# Patient Record
Sex: FEMALE | Race: OTHER
[De-identification: names, ages, dates, MRNs, and addresses within clinical notes are randomized per-mention and may not be internally consistent; named-entity substitution may affect disease eponyms.]

---

## 2018-11-28 ENCOUNTER — TRANSCRIPTION ENCOUNTER (OUTPATIENT)
Age: 33
End: 2018-11-28

## 2018-11-28 ENCOUNTER — APPOINTMENT (OUTPATIENT)
Dept: INTERNAL MEDICINE | Facility: CLINIC | Age: 33
End: 2018-11-28
Payer: COMMERCIAL

## 2018-11-28 VITALS
BODY MASS INDEX: 24.29 KG/M2 | WEIGHT: 132 LBS | HEART RATE: 68 BPM | HEIGHT: 62 IN | TEMPERATURE: 98.3 F | OXYGEN SATURATION: 98 % | RESPIRATION RATE: 14 BRPM | DIASTOLIC BLOOD PRESSURE: 72 MMHG | SYSTOLIC BLOOD PRESSURE: 130 MMHG

## 2018-11-28 DIAGNOSIS — Z82.49 FAMILY HISTORY OF ISCHEMIC HEART DISEASE AND OTHER DISEASES OF THE CIRCULATORY SYSTEM: ICD-10-CM

## 2018-11-28 DIAGNOSIS — Z88.9 ALLERGY STATUS TO UNSPECIFIED DRUGS, MEDICAMENTS AND BIOLOGICAL SUBSTANCES: ICD-10-CM

## 2018-11-28 DIAGNOSIS — Z83.49 FAMILY HISTORY OF OTHER ENDOCRINE, NUTRITIONAL AND METABOLIC DISEASES: ICD-10-CM

## 2018-11-28 DIAGNOSIS — J30.9 ALLERGIC RHINITIS, UNSPECIFIED: ICD-10-CM

## 2018-11-28 DIAGNOSIS — Z86.19 PERSONAL HISTORY OF OTHER INFECTIOUS AND PARASITIC DISEASES: ICD-10-CM

## 2018-11-28 DIAGNOSIS — Z87.09 PERSONAL HISTORY OF OTHER DISEASES OF THE RESPIRATORY SYSTEM: ICD-10-CM

## 2018-11-28 DIAGNOSIS — Z83.2 FAMILY HISTORY OF DISEASES OF THE BLOOD AND BLOOD-FORMING ORGANS AND CERTAIN DISORDERS INVOLVING THE IMMUNE MECHANISM: ICD-10-CM

## 2018-11-28 PROCEDURE — 36415 COLL VENOUS BLD VENIPUNCTURE: CPT

## 2018-11-28 PROCEDURE — 99385 PREV VISIT NEW AGE 18-39: CPT | Mod: 25

## 2018-11-28 NOTE — HISTORY OF PRESENT ILLNESS
[Health Maintenance] : health maintenance [___ Year(s) Ago] : [unfilled] year(s) ago [Lives Alone] : Patient lives alone [Unmarried] : unmarried [Working Full Time] : working full time [Occupation ___] : occupation: [unfilled] [Never Smoked Cigarettes] : has never smoked cigarettes [Occasional Use] : occasional alcohol use [Never] : has never used illicit drugs [Good] : good [Reg. Dental Visits] : She has regular dental visits [Premenopausal] : the patient is premenopausal [FreeTextEntry1] : Needs new PMD. [Binge Drinking] : denies binge drinking [Patient Concern] : no personal concern about alcohol use [Family Concern] : no family concern about alcohol use [Vision Problems] : She denies vision problems [Hearing Loss] : She denies hearing loss [de-identified] : declines flu shots, hx Tdap 2017, hx hep B series [de-identified] : \par Feeling well.\par Last ate 2 hrs ago- 1/2 sandwich with veggies, wants labs today\par Needs med refill.\par \par \par c/o mild hair loss x 1.5 yrs\par -colors hair on/off\par -denies hair pulling\par -denies scalp itching or rash\par -on biotin\par -taking on iron qd x 3 months, denies hx anemia just started as thought may help.  Denies heavy menses.\par \par hx allergic rhinitis - has postnasal drip on/off througout the year\par -hx ENT eval 2 yrs ago for sx- dx'd postnasal drip, advised claritin and netti pot.  Told has deviated septum.  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

## 2018-11-28 NOTE — HEALTH RISK ASSESSMENT
[0] : 2) Feeling down, depressed, or hopeless: Not at all (0) [Patient reported PAP Smear was normal] : Patient reported PAP Smear was normal [HIV test declined] : HIV test declined [PapSmearDate] : 2016

## 2018-11-28 NOTE — PHYSICAL EXAM
[No Acute Distress] : no acute distress [Well-Appearing] : well-appearing [Normal Sclera/Conjunctiva] : normal sclera/conjunctiva [PERRL] : pupils equal round and reactive to light [EOMI] : extraocular movements intact [Normal Outer Ear/Nose] : the outer ears and nose were normal in appearance [Normal TMs] : both tympanic membranes were normal [Normal Nasal Mucosa] : the nasal mucosa was normal [Supple] : supple [No Lymphadenopathy] : no lymphadenopathy [Thyroid Normal, No Nodules] : the thyroid was normal and there were no nodules present [No Respiratory Distress] : no respiratory distress  [Clear to Auscultation] : lungs were clear to auscultation bilaterally [Normal Rate] : normal rate  [Regular Rhythm] : with a regular rhythm [Normal S1, S2] : normal S1 and S2 [No Murmur] : no murmur heard [Pedal Pulses Present] : the pedal pulses are present [No Edema] : there was no peripheral edema [Soft] : abdomen soft [Non Tender] : non-tender [No HSM] : no HSM [Normal Supraclavicular Nodes] : no supraclavicular lymphadenopathy [Normal Posterior Cervical Nodes] : no posterior cervical lymphadenopathy [Normal Anterior Cervical Nodes] : no anterior cervical lymphadenopathy [No CVA Tenderness] : no CVA  tenderness [No Spinal Tenderness] : no spinal tenderness [No Joint Swelling] : no joint swelling [Grossly Normal Strength/Tone] : grossly normal strength/tone [No Rash] : no rash [Normal Gait] : normal gait [No Focal Deficits] : no focal deficits [Normal Affect] : the affect was normal [Alert and Oriented x3] : oriented to person, place, and time [de-identified] : +cobblestoning [de-identified] : scattered freckles

## 2018-11-28 NOTE — PAST MEDICAL HISTORY
[Definite ___ (Date)] : the last menstrual period was [unfilled] [Total Preg ___] : G[unfilled] [Living ___] : Living: [unfilled] [Regular Cycle Intervals] : have been regular [AB Induced ___] : elective abortions: [unfilled]

## 2018-11-28 NOTE — REVIEW OF SYSTEMS
[Negative] : Psychiatric [Fever] : no fever [Chills] : no chills [Earache] : no earache [Hoarseness] : no hoarseness [Sore Throat] : no sore throat [Chest Pain] : no chest pain [Cough] : no cough [Dyspnea on Exertion] : no dyspnea on exertion [Abdominal Pain] : no abdominal pain [Dizziness] : no dizziness [FreeTextEntry4] : see HPI

## 2018-11-28 NOTE — ASSESSMENT
[FreeTextEntry1] : \par hair loss-\par -advised to avoid use of hair chemicals\par -check iron studies/TSH\par -derm f/u\par \par allergic rhinitis-\par -advised claritin and flonase prn\par -hx ENT eval\par \par hx cold sores-\par -valtrex prn\par \par \par \par HCM\par -check screening labs; agreeable to STD/HIV screening\par -STD prevention with regular use of condoms counseled\par -declines flu shot\par -hx Tdap 2017\par -hx hep B series\par -hx negative PAP 2016 by GYN per pt\par -derm referral for screening.  Regular use of sun block for skin cancer prevention advised.\par -optho referral for screening\par -yearly dental screening advised\par -yearly eye screening advised\par \par \par Pt's cell: [phone]\par Pt declines f/u appt, yearly CPE and f/u as needed advised.

## 2018-11-30 LAB
25(OH)D3 SERPL-MCNC: 17.3 NG/ML
ALBUMIN SERPL ELPH-MCNC: 4.9 G/DL
ALP BLD-CCNC: 48 U/L
ALT SERPL-CCNC: 10 U/L
ANION GAP SERPL CALC-SCNC: 12 MMOL/L
AST SERPL-CCNC: 18 U/L
BASOPHILS # BLD AUTO: 0.01 K/UL
BASOPHILS NFR BLD AUTO: 0.1 %
BILIRUB SERPL-MCNC: 0.3 MG/DL
BUN SERPL-MCNC: 9 MG/DL
CALCIUM SERPL-MCNC: 9.5 MG/DL
CHLORIDE SERPL-SCNC: 102 MMOL/L
CHOLEST SERPL-MCNC: 190 MG/DL
CHOLEST/HDLC SERPL: 3.9 RATIO
CO2 SERPL-SCNC: 24 MMOL/L
CREAT SERPL-MCNC: 0.57 MG/DL
EOSINOPHIL # BLD AUTO: 0.08 K/UL
EOSINOPHIL NFR BLD AUTO: 1.2 %
FERRITIN SERPL-MCNC: 69 NG/ML
FOLATE SERPL-MCNC: 17.1 NG/ML
GLUCOSE SERPL-MCNC: 108 MG/DL
HBA1C MFR BLD HPLC: 4.7 %
HCT VFR BLD CALC: 41.2 %
HDLC SERPL-MCNC: 49 MG/DL
HGB BLD-MCNC: 13.8 G/DL
IMM GRANULOCYTES NFR BLD AUTO: 0.1 %
IRON SATN MFR SERPL: 25 %
IRON SERPL-MCNC: 99 UG/DL
LDLC SERPL CALC-MCNC: 68 MG/DL
LYMPHOCYTES # BLD AUTO: 1.48 K/UL
LYMPHOCYTES NFR BLD AUTO: 21.6 %
MAN DIFF?: NORMAL
MCHC RBC-ENTMCNC: 32.5 PG
MCHC RBC-ENTMCNC: 33.5 GM/DL
MCV RBC AUTO: 96.9 FL
MONOCYTES # BLD AUTO: 0.32 K/UL
MONOCYTES NFR BLD AUTO: 4.7 %
NEUTROPHILS # BLD AUTO: 4.95 K/UL
NEUTROPHILS NFR BLD AUTO: 72.3 %
PLATELET # BLD AUTO: 241 K/UL
POTASSIUM SERPL-SCNC: 4.1 MMOL/L
PROT SERPL-MCNC: 7.6 G/DL
RBC # BLD: 4.25 M/UL
RBC # FLD: 12.4 %
SODIUM SERPL-SCNC: 138 MMOL/L
TIBC SERPL-MCNC: 390 UG/DL
TRIGL SERPL-MCNC: 366 MG/DL
TSH SERPL-ACNC: 1.8 UIU/ML
UIBC SERPL-MCNC: 291 UG/DL
VIT B12 SERPL-MCNC: 291 PG/ML
WBC # FLD AUTO: 6.85 K/UL

## 2019-02-13 ENCOUNTER — APPOINTMENT (OUTPATIENT)
Dept: DERMATOLOGY | Facility: CLINIC | Age: 34
End: 2019-02-13

## 2019-03-05 ENCOUNTER — APPOINTMENT (OUTPATIENT)
Dept: DERMATOLOGY | Facility: CLINIC | Age: 34
End: 2019-03-05
Payer: COMMERCIAL

## 2019-03-05 VITALS — HEIGHT: 62 IN | WEIGHT: 133 LBS | BODY MASS INDEX: 24.48 KG/M2

## 2019-03-05 DIAGNOSIS — L30.9 DERMATITIS, UNSPECIFIED: ICD-10-CM

## 2019-03-05 DIAGNOSIS — D22.9 MELANOCYTIC NEVI, UNSPECIFIED: ICD-10-CM

## 2019-03-05 DIAGNOSIS — L64.9 ANDROGENIC ALOPECIA, UNSPECIFIED: ICD-10-CM

## 2019-03-05 PROCEDURE — 99204 OFFICE O/P NEW MOD 45 MIN: CPT

## 2019-03-06 NOTE — HISTORY OF PRESENT ILLNESS
[FreeTextEntry1] : NP: moles [de-identified] : DARCIE GARCIA is a 34 year F here for evaluation of 3 issues:\par 1. Moles on skin - present since she was a child, none growing, changing, bleeding, pruritic or painful.  No known family history of skin cancer.  No personal history of skin cancer.\par 2. Hair loss - present x months, feels like hair is shedding overall.  No scalp pain, burning, or pruritus.  Brothers with male pattern hair loss, otherwise no family history.  Had iron level, CBC, hgbA1c, CMP performed by Dr. Hernandez which was WNL. \par 3. Rash on dorsum of right hand present for a few weeks, thinks it is from the gloves she was using at work.  Was itchy previously but now is not bothering her too much.\par \par Referred by MEENAKSHI HERNANDEZ\par

## 2019-03-06 NOTE — PHYSICAL EXAM
[Alert] : alert [Oriented x 3] : ~L oriented x 3 [Well Nourished] : well nourished [Conjunctiva Non-injected] : conjunctiva non-injected [No Visual Lymphadenopathy] : no visual  lymphadenopathy [No Clubbing] : no clubbing [No Edema] : no edema [No Bromhidrosis] : no bromhidrosis [No Chromhidrosis] : no chromhidrosis [FreeTextEntry3] : Full body skin exam\par Total body skin exam performed including hair, scalp, face, neck, chest/breasts/axillae, abdomen, back, R upper ext, L upper ext, buttock/groin, R lower ext, L lower ext, hands, feet, digits/nails and eccrine/apocrine glands.\par  \par All were normal except as in the figure and/or the following:\par \par negative hair pull test, few miniaturized hairs on trichoscopy \par \par brown macules and papules with normal pigment pattern on trunk and extremities \par \par right dorsum of hand with erythematous rough plaque \par \par \par \par

## 2019-03-06 NOTE — ASSESSMENT
[FreeTextEntry1] : 1. Eczematous dermatitis, right dorsum of hand \par -Education on nature of condition\par -Start triamcinolone 0.1% ointment to AA on hand BID x 1 week\par -Reviewed gentle skin care practices \par \par 2.  Hair loss, ? Androgenetic alopecia, minimal\par -Education on nature of condition\par -Start minoxidil 5% foam daily, SED\par \par 3. Multiple benign nevi\par -Discussed benign nature, course, and importance of doing monthly skin checks\par -Counseled on need for sun protection, ABCDE rule of skin cancer detection, monthly exams, and informing us of any new or changing lesions \par -Reassurance\par \par 4. Skin cancer screening\par TBSE performed today\par -ABCDEs of melanoma discussed\par -SPF 30 or higher and reapply often\par -rtc q1yr for repeat skin exam or sooner if new/concerning lesion\par \par  \par

## 2019-03-14 ENCOUNTER — TRANSCRIPTION ENCOUNTER (OUTPATIENT)
Age: 34
End: 2019-03-14

## 2019-05-24 ENCOUNTER — TRANSCRIPTION ENCOUNTER (OUTPATIENT)
Age: 34
End: 2019-05-24

## 2019-12-11 ENCOUNTER — TRANSCRIPTION ENCOUNTER (OUTPATIENT)
Age: 34
End: 2019-12-11

## 2020-07-13 ENCOUNTER — APPOINTMENT (OUTPATIENT)
Dept: INTERNAL MEDICINE | Facility: CLINIC | Age: 35
End: 2020-07-13
Payer: COMMERCIAL

## 2020-07-13 VITALS
RESPIRATION RATE: 14 BRPM | TEMPERATURE: 98.8 F | BODY MASS INDEX: 23.19 KG/M2 | HEART RATE: 73 BPM | HEIGHT: 62 IN | DIASTOLIC BLOOD PRESSURE: 70 MMHG | SYSTOLIC BLOOD PRESSURE: 122 MMHG | WEIGHT: 126 LBS | OXYGEN SATURATION: 97 %

## 2020-07-13 DIAGNOSIS — L65.9 NONSCARRING HAIR LOSS, UNSPECIFIED: ICD-10-CM

## 2020-07-13 PROCEDURE — 99395 PREV VISIT EST AGE 18-39: CPT | Mod: 25

## 2020-07-13 PROCEDURE — G0444 DEPRESSION SCREEN ANNUAL: CPT

## 2020-07-13 RX ORDER — BIOTIN 5 MG
CAPSULE ORAL
Refills: 0 | Status: DISCONTINUED | COMMUNITY
End: 2020-07-13

## 2020-07-13 RX ORDER — ERGOCALCIFEROL 1.25 MG/1
1.25 MG CAPSULE, LIQUID FILLED ORAL
Qty: 8 | Refills: 0 | Status: DISCONTINUED | COMMUNITY
Start: 2018-11-30 | End: 2020-07-13

## 2020-07-13 RX ORDER — TRIAMCINOLONE ACETONIDE 1 MG/G
0.1 OINTMENT TOPICAL
Qty: 1 | Refills: 0 | Status: DISCONTINUED | COMMUNITY
Start: 2019-03-05 | End: 2020-07-13

## 2020-07-13 NOTE — HISTORY OF PRESENT ILLNESS
[FreeTextEntry1] : CPE [Binge Drinking] : denies binge drinking [Family Concern] : no family concern about alcohol use [Vision Problems] : She denies vision problems [Patient Concern] : no personal concern about alcohol use [de-identified] : 11/18 [Hearing Loss] : She denies hearing loss [de-identified] : \par Feeling well.\par Needs med refill.\par Fasting, just had coffee with coconut milk 2 hrs ago- wants slip\par \par Reports is socially distancing and using precautions for covid prevention.  Using prevention methods used at work.\par Denies sick or covid positive contacts.\par Denies fever, chills, cough or sob.\par \par \par hx mild hair loss x few yrs- stable\par -colors hair on/off\par -denies hair pulling\par -denies scalp itching or rash\par -off biotin\par -taking on iron qd, denies hx anemia just started as thought may help.  Denies heavy menses.\par -seen by derm 11/18, thought minimal hair loss, advised trial of minoxidil foam daily- states used w/o help and stopped after 1mo.  F/U pending\par \par hx allergic rhinitis - has postnasal drip on/off throughout the year\par -hx ENT eval 2016 for sx- dx'd postnasal drip, advised claritin and netti pot.  Told has deviated septum.  hx allergy testing, told +environmental, cats, grass\par -has a dog\par -using saline on/off\par -on flonase and claritin prn\par \par Reports wt stable in past year\par Vegetarian, occasionally eggs, no meat\par not exercising, stays active at work\par \par hx cold sores- hx valtrex prn with help\par \par \par Sexually active with 1 male partner on/off x 6 yrs, occasional condom use; denies STD dx.\par -denies  complaints.\par \par Reports stress over past few months- feels is "normal life stressors" mainly about career goals and relationship, interested to see therapist\par -denies depression or anxiety\par -reports good social support\par  [de-identified] : declines flu shots, hx Tdap 2017, hx hep B series

## 2020-07-13 NOTE — PHYSICAL EXAM
[No Acute Distress] : no acute distress [PERRL] : pupils equal round and reactive to light [Well-Appearing] : well-appearing [Normal Sclera/Conjunctiva] : normal sclera/conjunctiva [EOMI] : extraocular movements intact [Normal Outer Ear/Nose] : the outer ears and nose were normal in appearance [Normal TMs] : both tympanic membranes were normal [Normal Nasal Mucosa] : the nasal mucosa was normal [Supple] : supple [No Lymphadenopathy] : no lymphadenopathy [Thyroid Normal, No Nodules] : the thyroid was normal and there were no nodules present [No Respiratory Distress] : no respiratory distress  [Clear to Auscultation] : lungs were clear to auscultation bilaterally [Normal Rate] : normal rate  [Regular Rhythm] : with a regular rhythm [Normal S1, S2] : normal S1 and S2 [No Murmur] : no murmur heard [Pedal Pulses Present] : the pedal pulses are present [No Edema] : there was no peripheral edema [Soft] : abdomen soft [Non Tender] : non-tender [No HSM] : no HSM [Normal Supraclavicular Nodes] : no supraclavicular lymphadenopathy [Normal Posterior Cervical Nodes] : no posterior cervical lymphadenopathy [Normal Anterior Cervical Nodes] : no anterior cervical lymphadenopathy [No CVA Tenderness] : no CVA  tenderness [No Spinal Tenderness] : no spinal tenderness [No Joint Swelling] : no joint swelling [Grossly Normal Strength/Tone] : grossly normal strength/tone [No Rash] : no rash [Normal Gait] : normal gait [No Focal Deficits] : no focal deficits [Normal Affect] : the affect was normal [Alert and Oriented x3] : oriented to person, place, and time [de-identified] : min wax b/l, no sinus tenderness [de-identified] : scattered freckles

## 2020-07-13 NOTE — REVIEW OF SYSTEMS
[Negative] : Neurological [Earache] : no earache [Hoarseness] : no hoarseness [Sore Throat] : no sore throat [Dizziness] : no dizziness [FreeTextEntry4] : see HPI [de-identified] : see HPI

## 2020-07-13 NOTE — ASSESSMENT
[FreeTextEntry1] : \par HLD/TG: nonfasting labs; 11/18 Tchol 190  LDL 68 HDL 49\par -low fat diet, exercise advised\par -check lipids, slip given\par \par hx hair loss- stable\par -advised to avoid use of hair chemicals\par -11/18 iron studies/TSH wnl- check repeat\par -seen by derm 11/18, thought minimal hair loss, advised trial of minoxidil foam daily- off as felt no help.  F/U pending.\par -advised to restart biotin\par \par allergic rhinitis-\par -on claritin and flonase prn\par -hx ENT eval\par \par hx cold sores-\par -valtrex prn\par \par \par \par HCM\par -check screening labs; declines STD/HIV screening- slip given\par -STD and pregnancy prevention with regular use of condoms counseled\par -declines flu shot\par -hx Tdap 2017\par -hx hep B series\par -hx negative PAP 2016 by GYN per pt, has appt with new GYN 9/20 per pt\par -yearly derm screening exam advised.  Regular use of sun block for skin cancer prevention advised.\par -optho referral for screening, hx LASIK\par -yearly dental screening advised\par \par \par Pt's cell: [phone]\par Pt declines f/u appt, yearly CPE and f/u as needed advised.

## 2020-07-22 ENCOUNTER — TRANSCRIPTION ENCOUNTER (OUTPATIENT)
Age: 35
End: 2020-07-22

## 2020-07-31 ENCOUNTER — RX RENEWAL (OUTPATIENT)
Age: 35
End: 2020-07-31

## 2020-10-16 ENCOUNTER — TRANSCRIPTION ENCOUNTER (OUTPATIENT)
Age: 35
End: 2020-10-16

## 2021-01-19 ENCOUNTER — TRANSCRIPTION ENCOUNTER (OUTPATIENT)
Age: 36
End: 2021-01-19

## 2021-01-19 DIAGNOSIS — R43.8 OTHER DISTURBANCES OF SMELL AND TASTE: ICD-10-CM

## 2021-01-19 DIAGNOSIS — R43.2 PARAGEUSIA: ICD-10-CM

## 2021-02-15 ENCOUNTER — TRANSCRIPTION ENCOUNTER (OUTPATIENT)
Age: 36
End: 2021-02-15

## 2021-03-11 ENCOUNTER — APPOINTMENT (OUTPATIENT)
Dept: OTOLARYNGOLOGY | Facility: CLINIC | Age: 36
End: 2021-03-11
Payer: COMMERCIAL

## 2021-03-11 VITALS
WEIGHT: 130 LBS | HEIGHT: 62 IN | HEART RATE: 70 BPM | BODY MASS INDEX: 23.92 KG/M2 | DIASTOLIC BLOOD PRESSURE: 85 MMHG | SYSTOLIC BLOOD PRESSURE: 129 MMHG | TEMPERATURE: 98.2 F

## 2021-03-11 DIAGNOSIS — R09.82 POSTNASAL DRIP: ICD-10-CM

## 2021-03-11 DIAGNOSIS — J34.2 DEVIATED NASAL SEPTUM: ICD-10-CM

## 2021-03-11 DIAGNOSIS — R43.0 ANOSMIA: ICD-10-CM

## 2021-03-11 PROCEDURE — 99204 OFFICE O/P NEW MOD 45 MIN: CPT | Mod: 25

## 2021-03-11 PROCEDURE — 99072 ADDL SUPL MATRL&STAF TM PHE: CPT

## 2021-03-11 PROCEDURE — 31231 NASAL ENDOSCOPY DX: CPT

## 2021-03-11 NOTE — HISTORY OF PRESENT ILLNESS
[de-identified] : Patient complains of loss of taste and smell, states over the years it has gradually worsened. She can taste but not as well as she used to. Was told that she has a deviated nasal septum. If she were to walk into a room with a lite scented candle she can only smell it if she were to bring it close to her nose. Also complains of constant throat clearing, has a lot of mucus production in the morning. Denies hx of reflux.\par Pt has no ear pain, ear drainage, hearing loss, tinnitus, vertigo, nasal congestion, nasal discharge, epistaxis, sinus infections, facial pain, facial pressure, throat pain, dysphagia or fevers\par \par

## 2021-03-11 NOTE — ASSESSMENT
[FreeTextEntry1] : Patient has been complaining of diminished sense of smell she is known Covid negative.  On multiple occasions Covid negative.  On examination endoscopically no tumors masses or polyps she does have a deviated nasal septum she has been using Flonase with no significant improvement put her on a Medrol Dosepak and sent her for a CAT scan to rule out any frontal lobe lesions.  Will get back to her after the CAT scan and see if her sense of smell returned she also is given exercises regarding sense of smell exercises.

## 2021-06-14 ENCOUNTER — TRANSCRIPTION ENCOUNTER (OUTPATIENT)
Age: 36
End: 2021-06-14

## 2021-09-20 ENCOUNTER — TRANSCRIPTION ENCOUNTER (OUTPATIENT)
Age: 36
End: 2021-09-20

## 2021-09-21 ENCOUNTER — TRANSCRIPTION ENCOUNTER (OUTPATIENT)
Age: 36
End: 2021-09-21

## 2021-09-23 ENCOUNTER — TRANSCRIPTION ENCOUNTER (OUTPATIENT)
Age: 36
End: 2021-09-23

## 2021-09-24 ENCOUNTER — TRANSCRIPTION ENCOUNTER (OUTPATIENT)
Age: 36
End: 2021-09-24

## 2021-10-10 ENCOUNTER — NON-APPOINTMENT (OUTPATIENT)
Age: 36
End: 2021-10-10

## 2021-10-15 ENCOUNTER — APPOINTMENT (OUTPATIENT)
Dept: INTERNAL MEDICINE | Facility: CLINIC | Age: 36
End: 2021-10-15
Payer: COMMERCIAL

## 2021-10-15 VITALS
WEIGHT: 134 LBS | TEMPERATURE: 98.3 F | SYSTOLIC BLOOD PRESSURE: 130 MMHG | RESPIRATION RATE: 16 BRPM | DIASTOLIC BLOOD PRESSURE: 74 MMHG | HEART RATE: 62 BPM | HEIGHT: 62 IN | OXYGEN SATURATION: 97 % | BODY MASS INDEX: 24.66 KG/M2

## 2021-10-15 DIAGNOSIS — E55.9 VITAMIN D DEFICIENCY, UNSPECIFIED: ICD-10-CM

## 2021-10-15 DIAGNOSIS — Z00.00 ENCOUNTER FOR GENERAL ADULT MEDICAL EXAMINATION W/OUT ABNORMAL FINDINGS: ICD-10-CM

## 2021-10-15 DIAGNOSIS — H91.90 UNSPECIFIED HEARING LOSS, UNSPECIFIED EAR: ICD-10-CM

## 2021-10-15 DIAGNOSIS — Z78.9 OTHER SPECIFIED HEALTH STATUS: ICD-10-CM

## 2021-10-15 DIAGNOSIS — Z23 ENCOUNTER FOR IMMUNIZATION: ICD-10-CM

## 2021-10-15 DIAGNOSIS — F43.9 REACTION TO SEVERE STRESS, UNSPECIFIED: ICD-10-CM

## 2021-10-15 DIAGNOSIS — Z12.83 ENCOUNTER FOR SCREENING FOR MALIGNANT NEOPLASM OF SKIN: ICD-10-CM

## 2021-10-15 DIAGNOSIS — Z82.0 FAMILY HISTORY OF EPILEPSY AND OTHER DISEASES OF THE NERVOUS SYSTEM: ICD-10-CM

## 2021-10-15 DIAGNOSIS — Z11.59 ENCOUNTER FOR SCREENING FOR OTHER VIRAL DISEASES: ICD-10-CM

## 2021-10-15 PROCEDURE — 96127 BRIEF EMOTIONAL/BEHAV ASSMT: CPT

## 2021-10-15 PROCEDURE — 99395 PREV VISIT EST AGE 18-39: CPT | Mod: 25

## 2021-10-15 RX ORDER — METHYLPREDNISOLONE 4 MG/1
4 TABLET ORAL
Qty: 1 | Refills: 0 | Status: DISCONTINUED | COMMUNITY
Start: 2021-03-11 | End: 2021-10-15

## 2021-10-15 RX ORDER — FLUTICASONE PROPIONATE 50 UG/1
50 SPRAY, METERED NASAL
Qty: 1 | Refills: 5 | Status: DISCONTINUED | COMMUNITY
Start: 2021-03-11 | End: 2021-10-15

## 2021-10-15 NOTE — PAST MEDICAL HISTORY
[Regular Cycle Intervals] : have been regular [Total Preg ___] : G[unfilled] [Living ___] : Living: [unfilled] [AB Induced ___] : elective abortions: [unfilled]  [Definite ___ (Date)] : the last menstrual period was [unfilled]

## 2021-10-16 PROBLEM — F43.9 STRESS: Status: RESOLVED | Noted: 2020-07-13 | Resolved: 2021-10-16

## 2021-10-16 PROBLEM — Z23 ENCOUNTER FOR IMMUNIZATION: Status: RESOLVED | Noted: 2021-10-16 | Resolved: 2021-10-16

## 2021-10-16 PROBLEM — Z12.83 SCREENING FOR SKIN CANCER: Status: RESOLVED | Noted: 2019-03-05 | Resolved: 2021-10-16

## 2021-10-16 PROBLEM — Z82.0: Status: ACTIVE | Noted: 2021-10-16

## 2021-10-16 RX ORDER — CHLORHEXIDINE GLUCONATE 4 %
325 (65 FE) LIQUID (ML) TOPICAL DAILY
Qty: 90 | Refills: 1 | Status: DISCONTINUED | COMMUNITY
End: 2021-10-16

## 2021-10-16 RX ORDER — MULTIVITAMIN
CAPSULE ORAL
Refills: 0 | Status: ACTIVE | COMMUNITY

## 2021-10-16 NOTE — HISTORY OF PRESENT ILLNESS
[Health Maintenance] : health maintenance [Lives Alone] : Patient lives alone [Unmarried] : unmarried [Working Full Time] : working full time [Occupation ___] : occupation: [unfilled] [Never Smoked Cigarettes] : has never smoked cigarettes [Occasional Use] : occasional alcohol use [Never] : has never used illicit drugs [Good] : good [Reg. Dental Visits] : She has regular dental visits [Premenopausal] : the patient is premenopausal [Hearing Loss] : She has hearing loss [Healthy Diet] : She consumes a diverse and healthy diet [FreeTextEntry1] : \par CPE [Binge Drinking] : denies binge drinking [Patient Concern] : no personal concern about alcohol use [Family Concern] : no family concern about alcohol use [Vision Problems] : She denies vision problems [Regular Exercise] : She does not exercise regularly [de-identified] : 07/20 [de-identified] : declines flu shots, hx Tdap 2017, hx hep B series [de-identified] : \par Feeling well.\par Does not need med refill.\par Fasting -[facility]\par \par Reports is socially distancing and using precautions for covid prevention.  \par Denies sick or covid positive contacts.\par Denies fever, chills, cough or sob.\par -no hx vaccine, declines\par -gets weekly testing for work, last negative 1 week ago\par \par hx mild hair loss x few yrs- stable\par -colors hair on/off\par -denies hair pulling, scalp itching or rash\par -states seen by derm 3/21 for hair loss and with FBSE done, advised trial of minoxidil foam -use pending.  F/U pending\par \par allergic rhinitis - hx smell/taste loss on/off x few months, feels getting less of an issue on its own.\par -on Claritin and Flonase prn\par -followed by [facility], seen 3/21 with nl fiberoptic exam, hx negative covid testing.  Advised Medrol amrko (pending) and check CT scan (pending)\par \par c/o mild decreased hearing x 1 yr\par -denies ear pain or tinnitus.\par -uses qtips and rare ear bud use\par \par Reports increased wt in past year, thinks eating more in quantity and unhealthy desserts\par Vegetarian, occasionally eggs, no meat.  On MVI.\par exercising: hikes and bike rides 1x q 2 weeks; stays active at work \par \par hx cold sores-\par -on Valtrex prn with help\par \par \par Sexually active with BF x 2yrs , monogamous, no condom use; denies hx STD\par -denies  complaints\par \par Denies depression or anxiety.

## 2021-10-16 NOTE — PHYSICAL EXAM
[No Acute Distress] : no acute distress [Well-Appearing] : well-appearing [Normal Sclera/Conjunctiva] : normal sclera/conjunctiva [PERRL] : pupils equal round and reactive to light [EOMI] : extraocular movements intact [Normal Outer Ear/Nose] : the outer ears and nose were normal in appearance [Normal Nasal Mucosa] : the nasal mucosa was normal [Supple] : supple [No Lymphadenopathy] : no lymphadenopathy [Thyroid Normal, No Nodules] : the thyroid was normal and there were no nodules present [No Respiratory Distress] : no respiratory distress  [Clear to Auscultation] : lungs were clear to auscultation bilaterally [Normal Rate] : normal rate  [Regular Rhythm] : with a regular rhythm [Normal S1, S2] : normal S1 and S2 [No Murmur] : no murmur heard [Pedal Pulses Present] : the pedal pulses are present [No Edema] : there was no peripheral edema [Soft] : abdomen soft [Non Tender] : non-tender [No HSM] : no HSM [Normal Supraclavicular Nodes] : no supraclavicular lymphadenopathy [Normal Posterior Cervical Nodes] : no posterior cervical lymphadenopathy [Normal Anterior Cervical Nodes] : no anterior cervical lymphadenopathy [No CVA Tenderness] : no CVA  tenderness [No Spinal Tenderness] : no spinal tenderness [No Joint Swelling] : no joint swelling [Grossly Normal Strength/Tone] : grossly normal strength/tone [No Rash] : no rash [Normal Gait] : normal gait [No Focal Deficits] : no focal deficits [Normal Affect] : the affect was normal [Alert and Oriented x3] : oriented to person, place, and time [de-identified] : left ear: cerumen impaction, right ear: moderate cerumen, nontender exam; no sinus tenderness [de-identified] : scattered freckles

## 2021-10-16 NOTE — HEALTH RISK ASSESSMENT
[0] : 2) Feeling down, depressed, or hopeless: Not at all (0) [Patient reported PAP Smear was normal] : Patient reported PAP Smear was normal [Smoke Detector] : smoke detector [Carbon Monoxide Detector] : carbon monoxide detector [Seat Belt] :  uses seat belt [Sunscreen] : uses sunscreen [HIV Test offered] : HIV Test offered [Hepatitis C test offered] : Hepatitis C test offered [PHQ-2 Negative - No further assessment needed] : PHQ-2 Negative - No further assessment needed [[id number]] : 0 [Guns at Home] : no guns at home [PapSmearDate] : 01/18

## 2021-10-16 NOTE — REVIEW OF SYSTEMS
[Negative] : Psychiatric [Earache] : no earache [Hoarseness] : no hoarseness [Dizziness] : no dizziness [Sore Throat] : no sore throat [FreeTextEntry4] : see HPI [de-identified] : see HPI

## 2021-10-16 NOTE — DATA REVIEWED
[FreeTextEntry1] : \par 7/16/20 [facility]\par \par cmp wnl\par Tchol 154  LDL 81 HDL 53\par TSH 2.13, wnl\par A1c 4.7\par ferritin 14 (nl )\par serum iron 97, wnl\par TIBC 388, wnl\par iron % sat 25, wnl\par cbc wnl\par vit B12 300, wnl\par folate 12.5, wnl\par vit d 39\par \par covid19 AB: negative\par \par

## 2021-10-23 ENCOUNTER — TRANSCRIPTION ENCOUNTER (OUTPATIENT)
Age: 36
End: 2021-10-23

## 2021-10-27 ENCOUNTER — NON-APPOINTMENT (OUTPATIENT)
Age: 36
End: 2021-10-27

## 2021-12-30 ENCOUNTER — TRANSCRIPTION ENCOUNTER (OUTPATIENT)
Age: 36
End: 2021-12-30

## 2022-04-11 PROBLEM — Z11.59 SCREENING FOR VIRAL DISEASE: Status: RESOLVED | Noted: 2020-07-13 | Resolved: 2021-10-16

## 2022-09-28 ENCOUNTER — TRANSCRIPTION ENCOUNTER (OUTPATIENT)
Age: 37
End: 2022-09-28

## 2022-09-28 RX ORDER — FLUTICASONE PROPIONATE 50 UG/1
50 SPRAY, METERED NASAL
Qty: 1 | Refills: 1 | Status: ACTIVE | COMMUNITY
Start: 2018-11-28 | End: 1900-01-01

## 2024-03-21 ENCOUNTER — APPOINTMENT (OUTPATIENT)
Dept: OBGYN | Facility: CLINIC | Age: 39
End: 2024-03-21
Payer: COMMERCIAL

## 2024-03-21 VITALS
DIASTOLIC BLOOD PRESSURE: 82 MMHG | HEIGHT: 62 IN | BODY MASS INDEX: 25.76 KG/M2 | SYSTOLIC BLOOD PRESSURE: 135 MMHG | WEIGHT: 140 LBS

## 2024-03-21 DIAGNOSIS — Z01.419 ENCOUNTER FOR GYNECOLOGICAL EXAMINATION (GENERAL) (ROUTINE) W/OUT ABNORMAL FINDINGS: ICD-10-CM

## 2024-03-21 DIAGNOSIS — N89.8 OTHER SPECIFIED NONINFLAMMATORY DISORDERS OF VAGINA: ICD-10-CM

## 2024-03-21 PROCEDURE — 99396 PREV VISIT EST AGE 40-64: CPT

## 2024-03-21 RX ORDER — VALACYCLOVIR 1 G/1
1 TABLET, FILM COATED ORAL
Qty: 60 | Refills: 3 | Status: ACTIVE | COMMUNITY
Start: 2020-07-31 | End: 1900-01-01

## 2024-03-21 NOTE — DISCUSSION/SUMMARY
[FreeTextEntry1] : 38yo P0 well woman  f/u pap and mammo  discussed IVF, egg freezing and fertility ama risk  HCM labs  BV testing if positive then oral borid cacid suppositories

## 2024-03-21 NOTE — COUNSELING
[Nutrition/ Exercise/ Weight Management] : nutrition, exercise, weight management [Vitamins/Supplements] : vitamins/supplements [Bladder Hygiene] : bladder hygiene [Breast Self Exam] : breast self exam [Contraception/ Emergency Contraception/ Safe Sexual Practices] : contraception, emergency contraception, safe sexual practices

## 2024-03-21 NOTE — HISTORY OF PRESENT ILLNESS
[FreeTextEntry1] : 38yo    not currently sexually active  h/o BV in past w noted new odor  monthly menses q 28 days [Menarche Age: ____] : age at menarche was [unfilled] [Regular Cycle Intervals] : periods have been regular

## 2024-03-28 ENCOUNTER — NON-APPOINTMENT (OUTPATIENT)
Age: 39
End: 2024-03-28

## 2024-03-28 LAB
ANION GAP SERPL CALC-SCNC: 11 MMOL/L
ANTI-MUELLERIAN HORMONE: 0.95 NG/ML
BASOPHILS # BLD AUTO: 0.03 K/UL
BASOPHILS NFR BLD AUTO: 0.6 %
BUN SERPL-MCNC: 13 MG/DL
C TRACH RRNA SPEC QL NAA+PROBE: NOT DETECTED
CALCIUM SERPL-MCNC: 9.8 MG/DL
CANDIDA VAG CYTO: NOT DETECTED
CHLORIDE SERPL-SCNC: 101 MMOL/L
CHOLEST SERPL-MCNC: 199 MG/DL
CO2 SERPL-SCNC: 25 MMOL/L
CREAT SERPL-MCNC: 0.78 MG/DL
CYTOLOGY CVX/VAG DOC THIN PREP: ABNORMAL
EGFR: 98 ML/MIN/1.73M2
EOSINOPHIL # BLD AUTO: 0.05 K/UL
EOSINOPHIL NFR BLD AUTO: 1 %
ESTIMATED AVERAGE GLUCOSE: 94 MG/DL
G VAGINALIS+PREV SP MTYP VAG QL MICRO: DETECTED
GLUCOSE SERPL-MCNC: 96 MG/DL
HBA1C MFR BLD HPLC: 4.9 %
HBV SURFACE AG SER QL: NONREACTIVE
HCT VFR BLD CALC: 39.9 %
HCV AB SER QL: NONREACTIVE
HCV S/CO RATIO: 0.11 S/CO
HDLC SERPL-MCNC: 52 MG/DL
HGB BLD-MCNC: 13.7 G/DL
HIV1+2 AB SPEC QL IA.RAPID: NONREACTIVE
HPV HIGH+LOW RISK DNA PNL CVX: NOT DETECTED
IMM GRANULOCYTES NFR BLD AUTO: 0.2 %
LDLC SERPL CALC-MCNC: 125 MG/DL
LYMPHOCYTES # BLD AUTO: 1.34 K/UL
LYMPHOCYTES NFR BLD AUTO: 25.9 %
MAN DIFF?: NORMAL
MCHC RBC-ENTMCNC: 31.4 PG
MCHC RBC-ENTMCNC: 34.3 GM/DL
MCV RBC AUTO: 91.5 FL
MONOCYTES # BLD AUTO: 0.24 K/UL
MONOCYTES NFR BLD AUTO: 4.6 %
N GONORRHOEA RRNA SPEC QL NAA+PROBE: NOT DETECTED
NEUTROPHILS # BLD AUTO: 3.5 K/UL
NEUTROPHILS NFR BLD AUTO: 67.7 %
NONHDLC SERPL-MCNC: 147 MG/DL
PLATELET # BLD AUTO: 220 K/UL
POTASSIUM SERPL-SCNC: 4.5 MMOL/L
RBC # BLD: 4.36 M/UL
RBC # FLD: 12 %
SODIUM SERPL-SCNC: 138 MMOL/L
SOURCE AMPLIFICATION: NORMAL
SOURCE AMPLIFICATION: NORMAL
T PALLIDUM AB SER QL IA: NEGATIVE
T VAGINALIS RRNA SPEC QL NAA+PROBE: NOT DETECTED
T VAGINALIS VAG QL WET PREP: NOT DETECTED
TRIGL SERPL-MCNC: 123 MG/DL
TSH SERPL-ACNC: 1.37 UIU/ML
WBC # FLD AUTO: 5.17 K/UL

## 2024-03-28 RX ORDER — METRONIDAZOLE 500 MG/1
500 TABLET ORAL TWICE DAILY
Qty: 14 | Refills: 1 | Status: ACTIVE | COMMUNITY
Start: 2024-03-28 | End: 1900-01-01

## 2025-03-10 ENCOUNTER — TRANSCRIPTION ENCOUNTER (OUTPATIENT)
Age: 40
End: 2025-03-10

## 2025-03-14 ENCOUNTER — TRANSCRIPTION ENCOUNTER (OUTPATIENT)
Age: 40
End: 2025-03-14

## 2025-03-18 ENCOUNTER — APPOINTMENT (OUTPATIENT)
Dept: DERMATOLOGY | Facility: CLINIC | Age: 40
End: 2025-03-18
Payer: COMMERCIAL

## 2025-03-18 VITALS — HEIGHT: 62 IN | WEIGHT: 140 LBS | BODY MASS INDEX: 25.76 KG/M2

## 2025-03-18 DIAGNOSIS — D22.9 MELANOCYTIC NEVI, UNSPECIFIED: ICD-10-CM

## 2025-03-18 DIAGNOSIS — Z12.83 ENCOUNTER FOR SCREENING FOR MALIGNANT NEOPLASM OF SKIN: ICD-10-CM

## 2025-03-18 DIAGNOSIS — L91.8 OTHER HYPERTROPHIC DISORDERS OF THE SKIN: ICD-10-CM

## 2025-03-18 PROCEDURE — 99203 OFFICE O/P NEW LOW 30 MIN: CPT

## 2025-03-18 NOTE — ASSESSMENT
[FreeTextEntry1] :  DPNs/seborrheic keratoses/skin tags  face and neck- benign, reassurance, no intervention needed unless irritated - discussed removal options; discussed removal considered cosmetic - patient can schedule with cosmetic provider if interested in removing  Benign appearing nevi including on right posterior scalp, left plantar foot and left 4th toe - benign, reassurance, no intervention needed unless irritated   - TBSE performed today - no concerning findings - TBSE every year with dermatologist - Photoprotection reviewed including sun-protective behaviors, protective clothing, and the use of OTC broad-spectrum SPF 30+ sunscreens was advised - RTC if develops lesions that are new, symptomatic (bleeding/itching), changing in size/color/shape particularly in areas of prior sun exposure (face, hands, chest)

## 2025-03-18 NOTE — HISTORY OF PRESENT ILLNESS
[FreeTextEntry1] : evaluation of skin moles  [de-identified] : here for evaluation of skin moles - no new or changing skin lesions, no itching/bleeding/painful skin lesions  has one on posterior right scalp and on left foot   has several skin tags - previously has had removed

## 2025-03-18 NOTE — PHYSICAL EXAM
[FreeTextEntry3] : AAOx3, NAD, well-appearing / pleasant Total body skin exam performed with the exception of:  limited genital/groin evaluation, limited scalp evaluation All examined areas within normal limits with the exception of  skin colored to brown papules around neck  skin colored papule on right posterior scalp brown macules and papules on trunk and extremities with no concerning features on dermoscopy